# Patient Record
Sex: MALE | Race: WHITE | NOT HISPANIC OR LATINO | Employment: UNEMPLOYED | URBAN - METROPOLITAN AREA
[De-identification: names, ages, dates, MRNs, and addresses within clinical notes are randomized per-mention and may not be internally consistent; named-entity substitution may affect disease eponyms.]

---

## 2017-05-01 ENCOUNTER — TELEPHONE (OUTPATIENT)
Dept: PHYSICAL MEDICINE AND REHAB | Facility: CLINIC | Age: 22
End: 2017-05-01

## 2017-05-01 NOTE — TELEPHONE ENCOUNTER
----- Message from Sridevi Ruiz sent at 4/27/2017  2:11 PM CDT -----  Contact: Mom  Insurance needs the doctor's notes for the initial visit and the diagnosis and diagnosis codes for the injury. Please mail to Lani Joyce, [id number] [phone]. Please email if possible to [email or web address]. Or call Sruthi at [phone] if she could  this information.

## 2018-03-13 DIAGNOSIS — M25.571 CHRONIC PAIN OF RIGHT ANKLE: Primary | ICD-10-CM

## 2018-03-13 DIAGNOSIS — G89.29 CHRONIC PAIN OF RIGHT ANKLE: Primary | ICD-10-CM

## 2018-03-15 ENCOUNTER — TELEPHONE (OUTPATIENT)
Dept: PHYSICAL MEDICINE AND REHAB | Facility: CLINIC | Age: 23
End: 2018-03-15

## 2018-03-15 NOTE — TELEPHONE ENCOUNTER
Spoke with Racheal at [facility] advised I faxed the order and the auth to the main fax number that an  gave me . Racheal looked in her system and said she could see it there

## 2018-03-15 NOTE — TELEPHONE ENCOUNTER
----- Message from Maribel Skelton sent at 3/15/2018  3:41 PM CDT -----  Contact: Racheal with [facility]  Patient has an MRI scheduled for Saturday and they need an order faxed over for the right ankle.  Call Back#[phone]  Fax#[phone]  Thanks

## 2018-12-15 ENCOUNTER — OFFICE VISIT (OUTPATIENT)
Dept: URGENT CARE | Facility: CLINIC | Age: 23
End: 2018-12-15
Payer: COMMERCIAL

## 2018-12-15 VITALS
HEART RATE: 76 BPM | RESPIRATION RATE: 16 BRPM | DIASTOLIC BLOOD PRESSURE: 74 MMHG | BODY MASS INDEX: 31.15 KG/M2 | HEIGHT: 72 IN | SYSTOLIC BLOOD PRESSURE: 127 MMHG | WEIGHT: 230 LBS | OXYGEN SATURATION: 98 % | TEMPERATURE: 97 F

## 2018-12-15 DIAGNOSIS — J40 BRONCHITIS: Primary | ICD-10-CM

## 2018-12-15 DIAGNOSIS — F17.200 SMOKER: ICD-10-CM

## 2018-12-15 PROCEDURE — 94640 AIRWAY INHALATION TREATMENT: CPT | Mod: S$GLB,,, | Performed by: FAMILY MEDICINE

## 2018-12-15 PROCEDURE — 99204 OFFICE O/P NEW MOD 45 MIN: CPT | Mod: 25,S$GLB,, | Performed by: PHYSICIAN ASSISTANT

## 2018-12-15 PROCEDURE — 3008F BODY MASS INDEX DOCD: CPT | Mod: CPTII,S$GLB,, | Performed by: PHYSICIAN ASSISTANT

## 2018-12-15 PROCEDURE — 94664 DEMO&/EVAL PT USE INHALER: CPT | Mod: 59,S$GLB,, | Performed by: PHYSICIAN ASSISTANT

## 2018-12-15 PROCEDURE — 96372 THER/PROPH/DIAG INJ SC/IM: CPT | Mod: 59,S$GLB,, | Performed by: FAMILY MEDICINE

## 2018-12-15 RX ORDER — BENZONATATE 200 MG/1
200 CAPSULE ORAL 3 TIMES DAILY PRN
Qty: 60 CAPSULE | Refills: 1 | Status: SHIPPED | OUTPATIENT
Start: 2018-12-15 | End: 2018-12-25

## 2018-12-15 RX ORDER — ALBUTEROL SULFATE 0.83 MG/ML
2.5 SOLUTION RESPIRATORY (INHALATION)
Status: COMPLETED | OUTPATIENT
Start: 2018-12-15 | End: 2018-12-15

## 2018-12-15 RX ORDER — FLUTICASONE PROPIONATE 50 MCG
1 SPRAY, SUSPENSION (ML) NASAL 2 TIMES DAILY PRN
Qty: 1 BOTTLE | Refills: 1 | Status: SHIPPED | OUTPATIENT
Start: 2018-12-15 | End: 2020-03-23

## 2018-12-15 RX ORDER — PROMETHAZINE HYDROCHLORIDE AND DEXTROMETHORPHAN HYDROBROMIDE 6.25; 15 MG/5ML; MG/5ML
5 SYRUP ORAL NIGHTLY PRN
Qty: 180 ML | Refills: 1 | Status: SHIPPED | OUTPATIENT
Start: 2018-12-15 | End: 2018-12-25

## 2018-12-15 RX ORDER — IPRATROPIUM BROMIDE AND ALBUTEROL SULFATE 2.5; .5 MG/3ML; MG/3ML
3 SOLUTION RESPIRATORY (INHALATION) EVERY 6 HOURS PRN
Qty: 25 VIAL | Refills: 2 | Status: SHIPPED | OUTPATIENT
Start: 2018-12-15 | End: 2020-03-23

## 2018-12-15 RX ORDER — IPRATROPIUM BROMIDE 0.5 MG/2.5ML
0.5 SOLUTION RESPIRATORY (INHALATION)
Status: COMPLETED | OUTPATIENT
Start: 2018-12-15 | End: 2018-12-15

## 2018-12-15 RX ORDER — BETAMETHASONE SODIUM PHOSPHATE AND BETAMETHASONE ACETATE 3; 3 MG/ML; MG/ML
9 INJECTION, SUSPENSION INTRA-ARTICULAR; INTRALESIONAL; INTRAMUSCULAR; SOFT TISSUE
Status: COMPLETED | OUTPATIENT
Start: 2018-12-15 | End: 2018-12-15

## 2018-12-15 RX ADMIN — IPRATROPIUM BROMIDE 0.5 MG: 0.5 SOLUTION RESPIRATORY (INHALATION) at 11:12

## 2018-12-15 RX ADMIN — ALBUTEROL SULFATE 2.5 MG: 0.83 SOLUTION RESPIRATORY (INHALATION) at 11:12

## 2018-12-15 RX ADMIN — BETAMETHASONE SODIUM PHOSPHATE AND BETAMETHASONE ACETATE 9 MG: 3; 3 INJECTION, SUSPENSION INTRA-ARTICULAR; INTRALESIONAL; INTRAMUSCULAR; SOFT TISSUE at 11:12

## 2018-12-15 NOTE — PATIENT INSTRUCTIONS
Bronchitis, Viral (Adult)    You have a viral bronchitis. Bronchitis is inflammation and swelling of the lining of the lungs. This is often caused by an infection. Symptoms include a dry, hacking cough that is worse at night. The cough may bring up yellow-green mucus. You may also feel short of breath or wheeze. Other symptoms may include tiredness, chest discomfort, and chills.  Bronchitis that is caused by a virus is not treated with antibiotics. Instead, medicines may be given to help relieve symptoms. Symptoms can last up to 2 weeks, although the cough may last much longer.  This illness is contagious during the first few days and is spread through the air by coughing and sneezing, or by direct contact (touching the sick person and then touching your own eyes, nose, or mouth).  Most viral illnesses resolve within 10 to 14 days with rest and simple home remedies, although they may sometimes last for several weeks.  Home care  · If symptoms are severe, rest at home for the first 2 to 3 days. When you go back to your usual activities, don't let yourself get too tired.  · Do not smoke. Also avoid being exposed to secondhand smoke.  · You may use over-the-counter medicine to control fever or pain, unless another pain medicine was prescribed. (Note: If you have chronic liver or kidney disease or have ever had a stomach ulcer or gastrointestinal bleeding, talk with your healthcare provider before using these medicines. Also talk to your provider if you are taking medicine to prevent blood clots.) Aspirin should never be given to anyone younger than 18 years of age who is ill with a viral infection or fever. It may cause severe liver or brain damage.  · Your appetite may be poor, so a light diet is fine. Avoid dehydration by drinking 6 to 8 glasses of fluids per day (such as water, soft drinks, sports drinks, juices, tea, or soup). Extra fluids will help loosen secretions in the nose and lungs.  · Over-the-counter  cough, cold, and sore-throat medicines will not shorten the length of the illness, but they may help to reduce symptoms. (Note: Do not use decongestants if you have high blood pressure.)  Follow-up care  Follow up with your healthcare provider, or as advised. If you had an X-ray or ECG (electrocardiogram), a specialist will review it. You will be notified of any new findings that may affect your care.  Note: If you are age 65 or older, or if you have a chronic lung disease or condition that affects your immune system, or you smoke, talk to your healthcare provider about having pneumococcal vaccinations and a yearly influenza vaccination (flu shot).  When to seek medical advice  Call your healthcare provider right away if any of these occur:  · Fever of 100.4°F (38°C) or higher  · Coughing up increased amounts of colored sputum  · Weakness, drowsiness, headache, facial pain, ear pain, or a stiff neck  Call 911, or get immediate medical care  Contact emergency services right away if any of these occur:  · Coughing up blood  · Worsening weakness, drowsiness, headache, or stiff neck  · Trouble breathing, wheezing, or pain with breathing  Date Last Reviewed: 9/13/2015  © 2964-5561 [employer]. [address] [place] [address], [place] [address]. All rights reserved. This information is not intended as a substitute for professional medical care. Always follow your healthcare professional's instructions.    You received a steroid shot today - this can elevate your blood pressure, elevate your blood sugar, water weight gain, nervous energy, redness to the face and dimpling of the skin where the shot goes in.   If you were prescribed a narcotic medication, do not drive or operate heavy equipment or machinery while taking these medications.  You must understand that you've received an Urgent Care treatment only and that you may be released before all your medical problems are known or treated. You, the patient, will  arrange for follow up care as instructed.  Follow up with your PCP or specialty clinic as directed in the next 1-2 weeks if not improved or as needed.  You can call [phone] to schedule an appointment with the appropriate provider.  If your condition worsens we recommend that you receive another evaluation at the emergency room immediately or contact your primary medical clinics after hours call service to discuss your concerns.  Please return here or go to the Emergency Department for any concerns or worsening of condition.

## 2018-12-15 NOTE — PROGRESS NOTES
Subjective:       Patient ID: Michael White IV is a 23 y.o. male.    Vitals:  height is 6' (1.829 m) and weight is 104.3 kg (230 lb). His oral temperature is 97.4 °F (36.3 °C). His blood pressure is 127/74 and his pulse is 76. His respiration is 16 and oxygen saturation is 98%.     Chief Complaint: URI    Pt c/o cough, rhinorrhea, nasal and chest congestion, x 4 days      URI    This is a new problem. The current episode started in the past 7 days. The problem has been unchanged. There has been no fever. Associated symptoms include congestion (nasal and chest congestion ), coughing (x 3 days), headaches, nausea, rhinorrhea and wheezing. Pertinent negatives include no ear pain, rash, sinus pain, sore throat or vomiting. He has tried NSAIDs for the symptoms.       Constitution: Positive for appetite change, chills and fatigue. Negative for sweating and fever.   HENT: Positive for congestion (nasal and chest congestion ), postnasal drip and voice change. Negative for ear pain, sinus pain, sinus pressure and sore throat.    Neck: Negative for painful lymph nodes.   Eyes: Negative for eye redness.   Respiratory: Positive for chest tightness, cough (x 3 days), sputum production, shortness of breath and wheezing. Negative for bloody sputum, COPD, stridor and asthma.    Gastrointestinal: Positive for nausea. Negative for vomiting.   Musculoskeletal: Positive for muscle ache.   Skin: Negative for rash.   Allergic/Immunologic: Negative for seasonal allergies and asthma.   Neurological: Positive for headaches.   Hematologic/Lymphatic: Negative for swollen lymph nodes.       Objective:      Physical Exam   Constitutional: He is oriented to person, place, and time. He appears well-developed and well-nourished. He is cooperative.  Non-toxic appearance. He does not appear ill. No distress.   HENT:   Head: Normocephalic and atraumatic.   Right Ear: Hearing, tympanic membrane, external ear and ear canal normal.   Left Ear:  Hearing, tympanic membrane, external ear and ear canal normal.   Nose: Nose normal. No mucosal edema, rhinorrhea or nasal deformity. No epistaxis. Right sinus exhibits no maxillary sinus tenderness and no frontal sinus tenderness. Left sinus exhibits no maxillary sinus tenderness and no frontal sinus tenderness.   Mouth/Throat: Uvula is midline, oropharynx is clear and moist and mucous membranes are normal. No trismus in the jaw. Normal dentition. No uvula swelling. No posterior oropharyngeal erythema.   Eyes: Conjunctivae and lids are normal. No scleral icterus.   Sclera clear bilat   Neck: Trachea normal, full passive range of motion without pain and phonation normal. Neck supple.   Cardiovascular: Normal rate, regular rhythm, normal heart sounds, intact distal pulses and normal pulses.   Pulmonary/Chest: Effort normal. No respiratory distress. He has decreased breath sounds. He has wheezes in the right upper field, the right middle field, the right lower field, the left upper field, the left middle field and the left lower field.   Abdominal: Soft. Normal appearance and bowel sounds are normal. He exhibits no distension. There is no tenderness.   Musculoskeletal: Normal range of motion. He exhibits no edema or deformity.   Neurological: He is alert and oriented to person, place, and time. He exhibits normal muscle tone. Coordination normal.   Skin: Skin is warm, dry and intact. He is not diaphoretic. No pallor.   Psychiatric: He has a normal mood and affect. His speech is normal and behavior is normal. Judgment and thought content normal. Cognition and memory are normal.   Nursing note and vitals reviewed.      Assessment:       1. Bronchitis    2. Smoker        Plan:         Bronchitis  -     albuterol nebulizer solution 2.5 mg  -     ipratropium 0.02 % nebulizer solution 0.5 mg  -     betamethasone acetate-betamethasone sodium phosphate injection 9 mg  -     benzonatate (TESSALON) 200 MG capsule; Take 1 capsule  (200 mg total) by mouth 3 (three) times daily as needed for Cough.  Dispense: 60 capsule; Refill: 1  -     promethazine-dextromethorphan (PROMETHAZINE-DM) 6.25-15 mg/5 mL Syrp; Take 5 mLs by mouth nightly as needed.  Dispense: 180 mL; Refill: 1  -     fluticasone (FLONASE) 50 mcg/actuation nasal spray; 1 spray (50 mcg total) by Each Nare route 2 (two) times daily as needed for Rhinitis or Allergies.  Dispense: 1 Bottle; Refill: 1  -     sodium chloride (OCEAN NASAL) 0.65 % nasal spray; 1 spray by Nasal route as needed for Congestion.  Dispense: 45 mL; Refill: 3  -     NEBULIZER FOR HOME USE  -     NEBULIZER KIT (SUPPLIES) FOR HOME USE  -     albuterol-ipratropium (DUO-NEB) 2.5 mg-0.5 mg/3 mL nebulizer solution; Take 3 mLs by nebulization every 6 (six) hours as needed for Wheezing. Rescue  Dispense: 25 vial; Refill: 2    Smoker  -     albuterol nebulizer solution 2.5 mg  -     ipratropium 0.02 % nebulizer solution 0.5 mg  -     NEBULIZER FOR HOME USE  -     NEBULIZER KIT (SUPPLIES) FOR HOME USE  -     albuterol-ipratropium (DUO-NEB) 2.5 mg-0.5 mg/3 mL nebulizer solution; Take 3 mLs by nebulization every 6 (six) hours as needed for Wheezing. Rescue  Dispense: 25 vial; Refill: 2    Lung sounds improved significantly post nebulizer treatment.   Patient Instructions     Bronchitis, Viral (Adult)    You have a viral bronchitis. Bronchitis is inflammation and swelling of the lining of the lungs. This is often caused by an infection. Symptoms include a dry, hacking cough that is worse at night. The cough may bring up yellow-green mucus. You may also feel short of breath or wheeze. Other symptoms may include tiredness, chest discomfort, and chills.  Bronchitis that is caused by a virus is not treated with antibiotics. Instead, medicines may be given to help relieve symptoms. Symptoms can last up to 2 weeks, although the cough may last much longer.  This illness is contagious during the first few days and is spread through  the air by coughing and sneezing, or by direct contact (touching the sick person and then touching your own eyes, nose, or mouth).  Most viral illnesses resolve within 10 to 14 days with rest and simple home remedies, although they may sometimes last for several weeks.  Home care  · If symptoms are severe, rest at home for the first 2 to 3 days. When you go back to your usual activities, don't let yourself get too tired.  · Do not smoke. Also avoid being exposed to secondhand smoke.  · You may use over-the-counter medicine to control fever or pain, unless another pain medicine was prescribed. (Note: If you have chronic liver or kidney disease or have ever had a stomach ulcer or gastrointestinal bleeding, talk with your healthcare provider before using these medicines. Also talk to your provider if you are taking medicine to prevent blood clots.) Aspirin should never be given to anyone younger than 18 years of age who is ill with a viral infection or fever. It may cause severe liver or brain damage.  · Your appetite may be poor, so a light diet is fine. Avoid dehydration by drinking 6 to 8 glasses of fluids per day (such as water, soft drinks, sports drinks, juices, tea, or soup). Extra fluids will help loosen secretions in the nose and lungs.  · Over-the-counter cough, cold, and sore-throat medicines will not shorten the length of the illness, but they may help to reduce symptoms. (Note: Do not use decongestants if you have high blood pressure.)  Follow-up care  Follow up with your healthcare provider, or as advised. If you had an X-ray or ECG (electrocardiogram), a specialist will review it. You will be notified of any new findings that may affect your care.  Note: If you are age 65 or older, or if you have a chronic lung disease or condition that affects your immune system, or you smoke, talk to your healthcare provider about having pneumococcal vaccinations and a yearly influenza vaccination (flu shot).  When to  seek medical advice  Call your healthcare provider right away if any of these occur:  · Fever of 100.4°F (38°C) or higher  · Coughing up increased amounts of colored sputum  · Weakness, drowsiness, headache, facial pain, ear pain, or a stiff neck  Call 911, or get immediate medical care  Contact emergency services right away if any of these occur:  · Coughing up blood  · Worsening weakness, drowsiness, headache, or stiff neck  · Trouble breathing, wheezing, or pain with breathing  Date Last Reviewed: 9/13/2015  © 7910-2903 [employer]. [address] [place] [address]. All rights reserved. This information is not intended as a substitute for professional medical care. Always follow your healthcare professional's instructions.    You received a steroid shot today - this can elevate your blood pressure, elevate your blood sugar, water weight gain, nervous energy, redness to the face and dimpling of the skin where the shot goes in.   If you were prescribed a narcotic medication, do not drive or operate heavy equipment or machinery while taking these medications.  You must understand that you've received an Urgent Care treatment only and that you may be released before all your medical problems are known or treated. You, the patient, will arrange for follow up care as instructed.  Follow up with your PCP or specialty clinic as directed in the next 1-2 weeks if not improved or as needed.  You can call [phone] to schedule an appointment with the appropriate provider.  If your condition worsens we recommend that you receive another evaluation at the emergency room immediately or contact your primary medical clinics after hours call service to discuss your concerns.  Please return here or go to the Emergency Department for any concerns or worsening of condition.

## 2020-09-01 ENCOUNTER — OCCUPATIONAL HEALTH (OUTPATIENT)
Dept: URGENT CARE | Facility: CLINIC | Age: 25
End: 2020-09-01
Payer: COMMERCIAL

## 2020-09-01 DIAGNOSIS — Z20.822 EXPOSURE TO COVID-19 VIRUS: Primary | ICD-10-CM

## 2020-09-01 LAB
CTP QC/QA: YES
SARS-COV-2 RDRP RESP QL NAA+PROBE: NEGATIVE

## 2020-09-01 PROCEDURE — U0002: ICD-10-PCS | Mod: S$GLB,,, | Performed by: FAMILY MEDICINE

## 2020-09-01 PROCEDURE — U0002 COVID-19 LAB TEST NON-CDC: HCPCS | Mod: S$GLB,,, | Performed by: FAMILY MEDICINE

## 2021-01-18 ENCOUNTER — OFFICE VISIT (OUTPATIENT)
Dept: URGENT CARE | Facility: CLINIC | Age: 26
End: 2021-01-18
Payer: COMMERCIAL

## 2021-01-18 VITALS
OXYGEN SATURATION: 100 % | BODY MASS INDEX: 30.09 KG/M2 | HEART RATE: 86 BPM | RESPIRATION RATE: 18 BRPM | SYSTOLIC BLOOD PRESSURE: 135 MMHG | HEIGHT: 73 IN | DIASTOLIC BLOOD PRESSURE: 78 MMHG | TEMPERATURE: 99 F | WEIGHT: 227 LBS

## 2021-01-18 DIAGNOSIS — R09.82 PND (POST-NASAL DRIP): ICD-10-CM

## 2021-01-18 DIAGNOSIS — Z20.822 CLOSE EXPOSURE TO COVID-19 VIRUS: Primary | ICD-10-CM

## 2021-01-18 DIAGNOSIS — R53.83 FATIGUE, UNSPECIFIED TYPE: ICD-10-CM

## 2021-01-18 LAB
CTP QC/QA: YES
SARS-COV-2 RDRP RESP QL NAA+PROBE: NEGATIVE

## 2021-01-18 PROCEDURE — 99213 PR OFFICE/OUTPT VISIT, EST, LEVL III, 20-29 MIN: ICD-10-PCS | Mod: S$GLB,,, | Performed by: NURSE PRACTITIONER

## 2021-01-18 PROCEDURE — U0002 COVID-19 LAB TEST NON-CDC: HCPCS | Mod: QW,S$GLB,, | Performed by: NURSE PRACTITIONER

## 2021-01-18 PROCEDURE — 99213 OFFICE O/P EST LOW 20 MIN: CPT | Mod: S$GLB,,, | Performed by: NURSE PRACTITIONER

## 2021-01-18 PROCEDURE — U0002: ICD-10-PCS | Mod: QW,S$GLB,, | Performed by: NURSE PRACTITIONER

## 2021-05-10 ENCOUNTER — PATIENT MESSAGE (OUTPATIENT)
Dept: RESEARCH | Facility: HOSPITAL | Age: 26
End: 2021-05-10

## 2021-05-19 ENCOUNTER — OFFICE VISIT (OUTPATIENT)
Dept: URGENT CARE | Facility: CLINIC | Age: 26
End: 2021-05-19
Payer: COMMERCIAL

## 2021-05-19 VITALS
RESPIRATION RATE: 16 BRPM | TEMPERATURE: 98 F | SYSTOLIC BLOOD PRESSURE: 112 MMHG | HEART RATE: 79 BPM | OXYGEN SATURATION: 96 % | WEIGHT: 230 LBS | BODY MASS INDEX: 30.48 KG/M2 | HEIGHT: 73 IN | DIASTOLIC BLOOD PRESSURE: 69 MMHG

## 2021-05-19 DIAGNOSIS — J02.9 SORE THROAT: Primary | ICD-10-CM

## 2021-05-19 LAB
CTP QC/QA: YES
MOLECULAR STREP A: NEGATIVE
POC MOLECULAR INFLUENZA A AGN: NEGATIVE
POC MOLECULAR INFLUENZA B AGN: NEGATIVE
SARS-COV-2 RDRP RESP QL NAA+PROBE: NEGATIVE

## 2021-05-19 PROCEDURE — U0002: ICD-10-PCS | Mod: QW,S$GLB,, | Performed by: FAMILY MEDICINE

## 2021-05-19 PROCEDURE — 99214 OFFICE O/P EST MOD 30 MIN: CPT | Mod: 25,S$GLB,, | Performed by: FAMILY MEDICINE

## 2021-05-19 PROCEDURE — 87502 INFLUENZA DNA AMP PROBE: CPT | Mod: QW,S$GLB,, | Performed by: FAMILY MEDICINE

## 2021-05-19 PROCEDURE — U0002 COVID-19 LAB TEST NON-CDC: HCPCS | Mod: QW,S$GLB,, | Performed by: FAMILY MEDICINE

## 2021-05-19 PROCEDURE — 3008F BODY MASS INDEX DOCD: CPT | Mod: CPTII,S$GLB,, | Performed by: FAMILY MEDICINE

## 2021-05-19 PROCEDURE — 96372 THER/PROPH/DIAG INJ SC/IM: CPT | Mod: S$GLB,,, | Performed by: FAMILY MEDICINE

## 2021-05-19 PROCEDURE — 96372 PR INJECTION,THERAP/PROPH/DIAG2ST, IM OR SUBCUT: ICD-10-PCS | Mod: S$GLB,,, | Performed by: FAMILY MEDICINE

## 2021-05-19 PROCEDURE — 87651 POCT STREP A MOLECULAR: ICD-10-PCS | Mod: QW,S$GLB,, | Performed by: FAMILY MEDICINE

## 2021-05-19 PROCEDURE — 87502 POCT INFLUENZA A/B MOLECULAR: ICD-10-PCS | Mod: QW,S$GLB,, | Performed by: FAMILY MEDICINE

## 2021-05-19 PROCEDURE — 99214 PR OFFICE/OUTPT VISIT, EST, LEVL IV, 30-39 MIN: ICD-10-PCS | Mod: 25,S$GLB,, | Performed by: FAMILY MEDICINE

## 2021-05-19 PROCEDURE — 87651 STREP A DNA AMP PROBE: CPT | Mod: QW,S$GLB,, | Performed by: FAMILY MEDICINE

## 2021-05-19 PROCEDURE — 3008F PR BODY MASS INDEX (BMI) DOCUMENTED: ICD-10-PCS | Mod: CPTII,S$GLB,, | Performed by: FAMILY MEDICINE

## 2021-05-19 RX ORDER — DEXAMETHASONE SODIUM PHOSPHATE 100 MG/10ML
10 INJECTION INTRAMUSCULAR; INTRAVENOUS
Status: COMPLETED | OUTPATIENT
Start: 2021-05-19 | End: 2021-05-19

## 2021-05-19 RX ORDER — AZITHROMYCIN 250 MG/1
TABLET, FILM COATED ORAL
Qty: 6 TABLET | Refills: 0 | Status: SHIPPED | OUTPATIENT
Start: 2021-05-19

## 2021-05-19 RX ORDER — PROMETHAZINE HYDROCHLORIDE 6.25 MG/5ML
SYRUP ORAL
Qty: 120 ML | Refills: 1 | Status: SHIPPED | OUTPATIENT
Start: 2021-05-19

## 2021-05-19 RX ADMIN — DEXAMETHASONE SODIUM PHOSPHATE 10 MG: 100 INJECTION INTRAMUSCULAR; INTRAVENOUS at 01:05

## 2022-04-12 ENCOUNTER — OFFICE VISIT (OUTPATIENT)
Dept: ENDOCRINOLOGY | Facility: CLINIC | Age: 27
End: 2022-04-12
Payer: COMMERCIAL

## 2022-04-12 VITALS
HEIGHT: 73 IN | WEIGHT: 255.88 LBS | SYSTOLIC BLOOD PRESSURE: 138 MMHG | HEART RATE: 78 BPM | DIASTOLIC BLOOD PRESSURE: 70 MMHG | BODY MASS INDEX: 33.91 KG/M2

## 2022-04-12 DIAGNOSIS — E03.9 ACQUIRED HYPOTHYROIDISM: ICD-10-CM

## 2022-04-12 DIAGNOSIS — F31.62 BIPOLAR DISORDER, CURRENT EPISODE MIXED, MODERATE: ICD-10-CM

## 2022-04-12 DIAGNOSIS — R68.89 ABNORMAL ENDOCRINE LABORATORY TEST FINDING: Primary | ICD-10-CM

## 2022-04-12 PROCEDURE — 3008F PR BODY MASS INDEX (BMI) DOCUMENTED: ICD-10-PCS | Mod: CPTII,S$GLB,, | Performed by: INTERNAL MEDICINE

## 2022-04-12 PROCEDURE — 1160F RVW MEDS BY RX/DR IN RCRD: CPT | Mod: CPTII,S$GLB,, | Performed by: INTERNAL MEDICINE

## 2022-04-12 PROCEDURE — 3078F PR MOST RECENT DIASTOLIC BLOOD PRESSURE < 80 MM HG: ICD-10-PCS | Mod: CPTII,S$GLB,, | Performed by: INTERNAL MEDICINE

## 2022-04-12 PROCEDURE — 1159F MED LIST DOCD IN RCRD: CPT | Mod: CPTII,S$GLB,, | Performed by: INTERNAL MEDICINE

## 2022-04-12 PROCEDURE — 99999 PR PBB SHADOW E&M-EST. PATIENT-LVL III: CPT | Mod: PBBFAC,,, | Performed by: INTERNAL MEDICINE

## 2022-04-12 PROCEDURE — 99204 OFFICE O/P NEW MOD 45 MIN: CPT | Mod: S$GLB,,, | Performed by: INTERNAL MEDICINE

## 2022-04-12 PROCEDURE — 1160F PR REVIEW ALL MEDS BY PRESCRIBER/CLIN PHARMACIST DOCUMENTED: ICD-10-PCS | Mod: CPTII,S$GLB,, | Performed by: INTERNAL MEDICINE

## 2022-04-12 PROCEDURE — 3075F SYST BP GE 130 - 139MM HG: CPT | Mod: CPTII,S$GLB,, | Performed by: INTERNAL MEDICINE

## 2022-04-12 PROCEDURE — 99204 PR OFFICE/OUTPT VISIT, NEW, LEVL IV, 45-59 MIN: ICD-10-PCS | Mod: S$GLB,,, | Performed by: INTERNAL MEDICINE

## 2022-04-12 PROCEDURE — 3008F BODY MASS INDEX DOCD: CPT | Mod: CPTII,S$GLB,, | Performed by: INTERNAL MEDICINE

## 2022-04-12 PROCEDURE — 99999 PR PBB SHADOW E&M-EST. PATIENT-LVL III: ICD-10-PCS | Mod: PBBFAC,,, | Performed by: INTERNAL MEDICINE

## 2022-04-12 PROCEDURE — 3075F PR MOST RECENT SYSTOLIC BLOOD PRESS GE 130-139MM HG: ICD-10-PCS | Mod: CPTII,S$GLB,, | Performed by: INTERNAL MEDICINE

## 2022-04-12 PROCEDURE — 1159F PR MEDICATION LIST DOCUMENTED IN MEDICAL RECORD: ICD-10-PCS | Mod: CPTII,S$GLB,, | Performed by: INTERNAL MEDICINE

## 2022-04-12 PROCEDURE — 3078F DIAST BP <80 MM HG: CPT | Mod: CPTII,S$GLB,, | Performed by: INTERNAL MEDICINE

## 2022-04-12 RX ORDER — LEVOTHYROXINE, LIOTHYRONINE 38; 9 UG/1; UG/1
TABLET ORAL
COMMUNITY
Start: 2022-03-17

## 2022-04-12 RX ORDER — DIVALPROEX SODIUM 500 MG/1
500 TABLET, DELAYED RELEASE ORAL 3 TIMES DAILY
COMMUNITY
Start: 2022-03-28

## 2022-04-12 NOTE — PROGRESS NOTES
Subjective:    Patient ID:  Michael Joyce IV is a 27 y.o. male.    Chief Complaint:  Hypogonadism (Pt very lethargic ,tired,gained weight,lack of motivation, Sx for a long time)      Pt presents to establish care for abnl endocrine test and review of chronic medical conditions as listed in the visit diagnosis section of this encounter.    Pt is accompanied by his dad who also provided some history.       July 2021 had a psychotic episode. Prior to this was using illegal drugs.     Saw psych and was put on depakote for bipolar disorder. At some point was treated with lithium. Notes pt developed fatigue after this. Around Aug 2021, dad notes pt developed depression. Prior to this was a , enjoyed working out, and eating healthy. At some point saw PCP and per pt his testo was 400s and estrogen level was elevated. Sees [facility]. Also tx'ed by [facility] for hypothyroidism.    Denies hx of anabolic steroid use.    With regards to male hypogonadism:    SYMPTOMS:  Libido?: decreased  AM erections?: no. Stopped having morning erections about 1 year ago.   Erection at time of intercourse?: no  Maintains erections to ejaculation?: no  Decrease in shaving frequency?: no  Osteoporosis, height loss or history of fractures?: no  Hot flashes or night sweats?: no   Denies snoring.     Sense of smell: Intact  Peripheral vision issues: no  Gynecomastia: no  Breast tenderness, discharge or gynecomastia?: no  Headaches or blurry vision?: no  Nausea/vomiting, weight loss or dizziness/lightheadedness?: no  Head trauma?: had TBI playing football    Depression?: yes   Excessive daytime sleepiness?: yes, drinks caffeine to stay awake    DEVELOPMENTAL HISTORY:  Normal testis descent?: yes  Puberty onset at age: 14      RISK FACTORS:  Family history of hypogonadism?: no  Recent severe/critical illness?: no  STD's?: yes, once in college  Orchitis or hx of orchiectomy?: no  Mumps?: no  Radiation exposure?:  no  Secondary exposure to partner's vaginal estrogen?: no    Chronic corticosteroid or opiate use?: no  Marijuana?: yes  Lavender or tea tree oil?: yes, was drinking a lot of lavender oil   Excessive EtOH?: yes. Drank heavily on weekends but has cut this back recently.   Ismaelisis- buys [email or web address]    Lab Results       Component                Value               Date                       HCT                      44.2                03/03/2021                 HCT                      45.7                03/17/2020                 TRIG                     86                  03/03/2021                 TRIG                     66                  03/17/2020                 HDL                      61                  03/03/2021                 HDL                      52                  03/17/2020                          Review of Systems     Past Medical History:   Diagnosis Date    Concussion       Social History     Tobacco Use    Smoking status: Former Smoker     Types: Vaping with nicotine    Smokeless tobacco: Former User    Tobacco comment: vaping but stopped in 2018   Substance Use Topics    Alcohol use: Yes     Comment: every 2 weeks socially    Drug use: Never     Family History   Problem Relation Age of Onset    No Known Problems Mother     No Known Problems Father     No Known Problems Sister     No Known Problems Brother     No Known Problems Brother     Stroke Maternal Grandfather       Past Surgical History:   Procedure Laterality Date    FINGER FRACTURE SURGERY      FRACTURE SURGERY  2017    left foot plantar plate reconstruction    WRIST SURGERY Right           Current Outpatient Medications:     divalproex (DEPAKOTE) 500 MG TbEC, Take 500 mg by mouth 3 (three) times daily., Disp: , Rfl:     NP THYROID 60 mg Tab, TAKE 1 TABLET BY MOUTH EVERY MORNING 1 HOUR BEFORE BREAKFAST ON AN EMPTY STOMACH, Disp: , Rfl:     azithromycin (ZITHROMAX) 250 MG tablet, Take 2 pills on day one,  "then one pill each day until completed, Disp: 6 tablet, Rfl: 0    promethazine (PHENERGAN) 6.25 mg/5 mL syrup, 10mL at night as needed, Disp: 120 mL, Rfl: 1     Review of patient's allergies indicates:  No Known Allergies     Objective:   /70   Pulse 78   Ht 6' 1" (1.854 m)   Wt 116 kg (255 lb 13.5 oz)   BMI 33.75 kg/m²   BP Readings from Last 3 Encounters:   04/12/22 138/70   11/23/21 (!) 140/85   05/19/21 112/69     Wt Readings from Last 3 Encounters:   04/12/22 1338 116 kg (255 lb 13.5 oz)   11/23/21 2059 114.7 kg (252 lb 13.9 oz)   05/19/21 1213 104.3 kg (230 lb)          Physical Exam  Vitals reviewed.   Constitutional:       General: He is not in acute distress.     Appearance: Normal appearance. He is well-developed. He is not ill-appearing, toxic-appearing or diaphoretic.      Comments: Well developed WM   HENT:      Head: Normocephalic and atraumatic.   Eyes:      General: No scleral icterus.  Neck:      Trachea: No tracheal deviation.      Comments:  No thyromegaly or palpable thyroid nodules    Cardiovascular:      Rate and Rhythm: Normal rate and regular rhythm.      Heart sounds: No murmur heard.  Pulmonary:      Effort: Pulmonary effort is normal. No respiratory distress.   Chest:   Breasts: Breasts are symmetrical.      Right: Normal. No mass, nipple discharge or axillary adenopathy.      Left: Normal. No mass, nipple discharge or axillary adenopathy.        Comments: No gynecomastia  Genitourinary:     Penis: Normal and circumcised.       Testes: Normal.         Right: Mass or swelling not present.         Left: Mass or swelling not present.      Sven stage (genital): 5.      Comments: Testicular size wnls. Nl size penis.  Lymphadenopathy:      Cervical: No cervical adenopathy.      Upper Body:      Right upper body: No axillary adenopathy.      Left upper body: No axillary adenopathy.      Lower Body: No right inguinal adenopathy. No left inguinal adenopathy.   Neurological:      Mental " Status: He is alert and oriented to person, place, and time.   Psychiatric:         Thought Content: Thought content normal.           Lab Results   Component Value Date     03/03/2021    K 4.0 03/03/2021     03/03/2021    CO2 30 03/03/2021    BUN 16 03/03/2021    CREATININE 1.15 03/03/2021    GLU 93 03/03/2021    AST 39 03/03/2021    ALT 25 03/03/2021    ALBUMIN 4.6 03/03/2021    PROT 6.8 03/03/2021    BILITOT 0.5 03/03/2021    WBC 4.17 03/03/2021    HGB 14.7 03/03/2021    HCT 44.2 03/03/2021    MCV 89 03/03/2021    MCH 29.5 03/03/2021     03/03/2021    MPV 9.2 03/03/2021    GRAN 2.1 03/03/2021    GRAN 51.1 03/03/2021    LYMPH 1.5 03/03/2021    LYMPH 35.7 03/03/2021    CHOL 182 03/03/2021    HDL 61 03/03/2021    LDLCALC 103.8 03/03/2021    TRIG 86 03/03/2021       Lab Results   Component Value Date    TSH 3.390 03/03/2021        Thyroid Labs Latest Ref Rng & Units 3/17/2020 3/3/2021   TSH 0.400 - 4.000 uIU/mL 2.710 3.390   Sodium 136 - 145 mmol/L 137 139   Potassium 3.5 - 5.1 mmol/L 4.4 4.0   Chloride 95 - 110 mmol/L 103 100   Carbon Dioxide 22 - 31 mmol/L 24 30   Glucose 70 - 110 mg/dL 90 93   Blood Urea Nitrogen 9 - 21 mg/dL 25(H) 16   Creatinine 0.50 - 1.40 mg/dL 1.27 1.15   Calcium 8.4 - 10.2 mg/dL 9.7 9.7   Total Protein 6.0 - 8.4 g/dL 7.1 6.8   Albumin 3.5 - 5.2 g/dL 4.5 4.6   Total Bilirubin 0.2 - 1.3 mg/dL 0.4 0.5   AST 17 - 59 U/L 37 39   ALT 0 - 50 U/L 23 25   Anion Gap 8 - 16 mmol/L 10 9   eGFR (African American) >60 mL/min/1.73 m:2 >60 >60   eGFR (Non-African American) >60 mL/min/1.73 m:2 >60 >60   WBC 3.90 - 12.70 K/uL 4.99 4.17   RBC 4.60 - 6.20 M/uL 5.30 4.99   Hemoglobin 14.0 - 18.0 g/dL 15.1 14.7   Hematocrit 40.0 - 54.0 % 45.7 44.2   MCV 82 - 98 fL 86 89   MCH 27.0 - 31.0 pg 28.5 29.5   MCHC 32.0 - 36.0 g/dL 33.0 33.3   RDW 11.5 - 14.5 % 12.3 12.7   Platelets 150 - 350 K/uL 329 298   MPV 9.2 - 12.9 fL 9.0(L) 9.2   Gran # 1.8 - 7.7 K/uL 2.9 2.1   Lymph # 1.0 - 4.8 K/uL 1.5 1.5    Mono # 0.3 - 1.0 K/uL 0.4 0.4   Eos # 0.0 - 0.5 K/uL 0.2 0.1   Baso # 0.00 - 0.20 K/uL 0.03 0.04   Gran % 38.0 - 73.0 % 57.5 51.1   Lymph % 18.0 - 48.0 % 30.5 35.7   Mono% 4.0 - 15.0 % 7.2 9.4   Eos % 0.0 - 8.0 % 4.2 2.6   Baso % 0.0 - 1.9 % 0.6 1.0         No results found for: HGBA1C      Assessment and plan:     Problem List Items Addressed This Visit        Psychiatric    Bipolar disorder, current episode mixed, moderate     Pt with h/o bipolar d/o. Was on lithium and currently taking valproate which can be associated with abnl thyroid function test.               Endocrine    Acquired hypothyroidism     RF includes lithium use. Valproate can also be associated with abnl thyroid test. TFTs July 2021 wnls. Is currently tx'ed with NP thyroid. F/u with PCP.              Other    Abnormal endocrine laboratory test finding - Primary     Per pt he had low testo and elevated estrogen. Also notes fluctuating energy levels and low libido. Testicular exam wnls. No gynecomastia on exam. Will request prior. Discussed may need to do additional testing. Does have some RFs including marijuana use, excessive lavender oil use, excessive ETOH use, and Kratom use.                  Get most recent records/labs from [facility]    RTC prn based off lab results      Disclaimer: This note has been generated using voice-recognition software. There may be typographical errors that have been missed during proof-reading.

## 2022-04-13 NOTE — ASSESSMENT & PLAN NOTE
Per pt he had low testo and elevated estrogen. Also notes fluctuating energy levels and low libido. Testicular exam wnls. No gynecomastia on exam. Will request prior. Discussed may need to do additional testing. Does have some RFs including marijuana use, excessive lavender oil use, excessive ETOH use, and Kratom use.

## 2022-04-13 NOTE — ASSESSMENT & PLAN NOTE
RF includes lithium use. Valproate can also be associated with abnl thyroid test. TFTs July 2021 wnls. Is currently tx'ed with NP thyroid. F/u with PCP.

## 2022-04-13 NOTE — ASSESSMENT & PLAN NOTE
Pt with h/o bipolar d/o. Was on lithium and currently taking valproate which can be associated with abnl thyroid function test.

## 2022-05-15 ENCOUNTER — TELEPHONE (OUTPATIENT)
Dept: ENDOCRINOLOGY | Facility: CLINIC | Age: 27
End: 2022-05-15
Payer: COMMERCIAL

## 2022-05-15 DIAGNOSIS — R68.89 ABNORMAL ENDOCRINE LABORATORY TEST FINDING: Primary | ICD-10-CM

## 2022-05-15 NOTE — TELEPHONE ENCOUNTER
"Labs and note reviewed from Dr. Bird ([facility]).     "significant labs" were documented in note as:  Testo 395.   Estrone 82 (no reference range given)  AST 55  ALT 75  TSH 1.47  T3 2.7  T4 1.1    In general total testo not low. We don't check estrone. Has no clinical meaning in such a case.    His liver test were elevated and that should be followed up on by PCP.    Should do the attached labs and RTC to discuss results if pt is interested. Labs 8 am and fasting      ___________________________  Below is for my records:    Of note, doctor also noted that pt was demanding testo replacement.  "

## 2022-05-16 NOTE — TELEPHONE ENCOUNTER
"Pt advised of Dr. Sandifer's message and verb understanding.  Pt states "he will just hold off on additional testing for now".  He will let us know if he decides to do them, will schedule f/u appt with Dr. Sandifer if he does the lab work.   "

## 2025-06-19 ENCOUNTER — OFFICE VISIT (OUTPATIENT)
Dept: URGENT CARE | Facility: CLINIC | Age: 30
End: 2025-06-19
Payer: COMMERCIAL

## 2025-06-19 VITALS
DIASTOLIC BLOOD PRESSURE: 72 MMHG | HEIGHT: 73 IN | HEART RATE: 73 BPM | BODY MASS INDEX: 33.8 KG/M2 | OXYGEN SATURATION: 97 % | RESPIRATION RATE: 18 BRPM | TEMPERATURE: 98 F | SYSTOLIC BLOOD PRESSURE: 124 MMHG | WEIGHT: 255 LBS

## 2025-06-19 DIAGNOSIS — R11.2 NAUSEA VOMITING AND DIARRHEA: Primary | ICD-10-CM

## 2025-06-19 DIAGNOSIS — R19.7 NAUSEA VOMITING AND DIARRHEA: Primary | ICD-10-CM

## 2025-06-19 PROCEDURE — 99214 OFFICE O/P EST MOD 30 MIN: CPT | Mod: S$GLB,,, | Performed by: EMERGENCY MEDICINE

## 2025-06-19 RX ORDER — ONDANSETRON 4 MG/1
4 TABLET, ORALLY DISINTEGRATING ORAL EVERY 8 HOURS PRN
Qty: 15 TABLET | Refills: 0 | Status: SHIPPED | OUTPATIENT
Start: 2025-06-19

## 2025-06-19 RX ORDER — SERTRALINE HYDROCHLORIDE 50 MG/1
50 TABLET, FILM COATED ORAL DAILY
COMMUNITY

## 2025-06-19 NOTE — PATIENT INSTRUCTIONS
"Start with small sips of Gatorade, Powerade, and Pedialyte or try "Liquid IV" products. Advance diet as tolerated, starting with saltine crackers, clear soup broths, and Jello.     BRAT - bananas, rice, applesauce, toast.     Consider a daily probiotic such as Culturelle or Align.     For any diarrhea that lasts longer than 7 days or becomes bloody at any time, you must get rechecked to see if stool studies are needed.     For any sudden or severe abdominal pain or severe weakness, we recommend evaluation at the hospital ER.    You must understand that you've received an Urgent Care treatment only and that you may be released before all your medical problems are known or treated. You, the patient, will arrange for follow up care as instructed.    Follow up with your PCP or specialty clinic as directed if not improved or as needed. You can call [phone] to schedule an appointment with the appropriate provider.      You, the patient, will arrange for follow up care as instructed.     If your condition worsens or fails to improve we recommend that you receive another evaluation at the ER immediately or contact your PCP to discuss your concerns.     Patient aware of treatment plan and verbalized understanding.    "

## 2025-06-19 NOTE — PROGRESS NOTES
"Subjective:      Patient ID: Michael Joyce IV is a 30 y.o. male.    Vitals:  height is 6' 1" (1.854 m) and weight is 115.7 kg (255 lb). His oral temperature is 98.3 °F (36.8 °C). His blood pressure is 124/72 and his pulse is 73. His respiration is 18 and oxygen saturation is 97%.     Chief Complaint: Emesis    Pt states headache and nausea that started 2 days ago. Ate triple burger at local restaurant, others also ate burgers, felt abdominal "fullness" that night, yesterday AM had nausea, epigastric discomfort, diarrhea (loose watery, nonbloody), and had some episodes of vomiting. No recent travel or antibiotics. No emesis today but poor appetite still. Moderate diarrhea still. Mild generalized abd discomfort. Slightly decreased UOP. Got 1L IV fluids this AM prior to visit.     Emesis   This is a new problem. The current episode started yesterday. There has been no fever. Associated symptoms include abdominal pain, chills, diarrhea and dizziness. Pertinent negatives include no chest pain, coughing, fever, headaches or myalgias. He has tried nothing for the symptoms. The treatment provided no relief.       Constitution: Positive for chills. Negative for fatigue and fever.   HENT:  Negative for congestion, sinus pain, sinus pressure and sore throat.    Cardiovascular:  Negative for chest pain and palpitations.   Eyes:  Negative for blurred vision.   Respiratory:  Negative for cough and shortness of breath.    Gastrointestinal:  Positive for abdominal pain, nausea, vomiting and diarrhea. Negative for history of abdominal surgery, constipation, bright red blood in stool and rectal bleeding.   Genitourinary:  Negative for dysuria and urgency.   Musculoskeletal:  Negative for muscle cramps and muscle ache.   Skin:  Negative for rash and hives.   Allergic/Immunologic: Negative for hives.   Neurological:  Positive for dizziness. Negative for headaches.      Objective:     Physical Exam   Constitutional: He is oriented to " "person, place, and time. He appears well-developed.   HENT:   Head: Normocephalic and atraumatic.   Ears:   Right Ear: External ear normal.   Left Ear: External ear normal.   Nose: Rhinorrhea and congestion present.   Mouth/Throat: Mucous membranes are normal. Mucous membranes are moist.   Eyes: Conjunctivae and lids are normal.   Neck: Trachea normal. Neck supple.   Cardiovascular: Normal rate, regular rhythm and normal heart sounds.   Pulmonary/Chest: Effort normal and breath sounds normal. No respiratory distress.   Abdominal: Normal appearance. He exhibits no distension and no mass. Soft. Bowel sounds are increased. There is abdominal tenderness (generalized abd discomfort on exam, no focal areas of TTP, no TTP at McBurney's point). There is no rebound and no guarding.   Musculoskeletal: Normal range of motion.         General: Normal range of motion.   Neurological: He is alert and oriented to person, place, and time. He has normal strength.   Skin: Skin is warm, dry, intact, not diaphoretic and not pale.   Psychiatric: His speech is normal and behavior is normal. Judgment and thought content normal.   Nursing note and vitals reviewed.        Assessment:     1. Nausea vomiting and diarrhea        Plan:     Fairly benign abd exam. N/V/D, no emesis since yesterday. Considered acute appendicitis, GERD, diverticulitis but exam/progression does not suggest those conditions. Suspect viral illness. Recheck for new/worsening symptoms.    Recommend probiotic.     Clear liquids and advance diet as tolerated.     Imodium only for severe diarrhea.         Nausea vomiting and diarrhea  -     ondansetron (ZOFRAN-ODT) 4 MG TbDL; Take 1 tablet (4 mg total) by mouth every 8 (eight) hours as needed (nausea/vomiting).  Dispense: 15 tablet; Refill: 0      Patient Instructions   Start with small sips of Gatorade, Powerade, and Pedialyte or try "Liquid IV" products. Advance diet as tolerated, starting with saltine crackers, clear " soup broths, and Jello.     BRAT - bananas, rice, applesauce, toast.     Consider a daily probiotic such as Culturelle or Align.     For any diarrhea that lasts longer than 7 days or becomes bloody at any time, you must get rechecked to see if stool studies are needed.     For any sudden or severe abdominal pain or severe weakness, we recommend evaluation at the hospital ER.    You must understand that you've received an Urgent Care treatment only and that you may be released before all your medical problems are known or treated. You, the patient, will arrange for follow up care as instructed.    Follow up with your PCP or specialty clinic as directed if not improved or as needed. You can call [phone] to schedule an appointment with the appropriate provider.      You, the patient, will arrange for follow up care as instructed.     If your condition worsens or fails to improve we recommend that you receive another evaluation at the ER immediately or contact your PCP to discuss your concerns.     Patient aware of treatment plan and verbalized understanding.       Medical Decision Making:   Differential Diagnosis:   Gastroenteritis, viral vs food-borne, Acute appendicitis, UTI, GERD, Diverticulitis  Urgent Care Management:  History and clinical exam

## 2025-06-19 NOTE — LETTER
June 19, 2025      [facility] [address] , [address]  [facility] [address]  Phone: [phone]  Fax: [phone]       Patient: Michael Joyce   YOB: 1995  Date of Visit: 06/19/2025    To Whom It May Concern:    Tanvir Joyce  was at [facility] on 06/19/2025. The patient may return on 6/20/2025. Please excuse 6/19/25 due to illness.     Sincerely,    Jordana Holland MD